# Patient Record
Sex: MALE | Race: WHITE
[De-identification: names, ages, dates, MRNs, and addresses within clinical notes are randomized per-mention and may not be internally consistent; named-entity substitution may affect disease eponyms.]

---

## 2017-09-16 ENCOUNTER — HOSPITAL ENCOUNTER (EMERGENCY)
Dept: HOSPITAL 47 - EC | Age: 42
Discharge: HOME | End: 2017-09-16
Payer: COMMERCIAL

## 2017-09-16 VITALS
RESPIRATION RATE: 18 BRPM | TEMPERATURE: 98.4 F | DIASTOLIC BLOOD PRESSURE: 72 MMHG | HEART RATE: 82 BPM | SYSTOLIC BLOOD PRESSURE: 118 MMHG

## 2017-09-16 DIAGNOSIS — Z79.899: ICD-10-CM
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
## 2017-09-16 DIAGNOSIS — S46.911A: Primary | ICD-10-CM

## 2017-09-16 DIAGNOSIS — Z79.1: ICD-10-CM

## 2017-09-16 PROCEDURE — 99284 EMERGENCY DEPT VISIT MOD MDM: CPT

## 2017-09-16 NOTE — XR
EXAMINATION TYPE: XR shoulder complete RT , 3 VIEWS

 

DATE OF EXAM ORDERED: 9/16/2017

 

HISTORY: Pain.

 

COMPARISON: None.

 

FINDINGS:  No fracture, dislocation or other acute osseous lesion is seen.

 

IMPRESSION: 

 

NORMAL RIGHT SHOULDER.

## 2017-09-16 NOTE — ED
General Adult HPI





- General


Chief complaint: Extremity Injury, Upper


Stated complaint: RT SHOULDER PAIN


Time Seen by Provider: 09/16/17 12:43


Source: patient, RN notes reviewed, old records reviewed


Mode of arrival: ambulatory


Limitations: no limitations





- History of Present Illness


Initial comments: 





Chief complaint history of present illness a 41-year-old male reports that he 

was using a tire iron try to remove tire today when he felt a sharp pop in his 

right shoulder and has pain now over the deltoid region.





- Related Data


 Home Medications











 Medication  Instructions  Recorded  Confirmed


 


Ibuprofen 800 mg PO Q6H 09/16/17 09/16/17


 


Loratadine 10 mg PO DAILY 09/16/17 09/16/17








 Previous Rx's











 Medication  Instructions  Recorded


 


Ibuprofen [Motrin] 600 mg PO Q6HR PRN #20 tab 09/16/17











 Allergies











Allergy/AdvReac Type Severity Reaction Status Date / Time


 


No Known Allergies Allergy   Verified 09/16/17 14:12














Review of Systems


ROS Statement: 


Those systems with pertinent positive or pertinent negative responses have been 

documented in the HPI.


Review of systems mild discomfort to the right side of his neck as well as the 

right shoulder more deltoid region.  The patient reports he feels better in a 

sling.  He is able to put his right hand on his left shoulder.  No evidence of 

dislocation.  Patient denies chest pain shortness breath GI/ problems no 

other deficits.  All systems were reviewed.





Past medical problems significant for seasonal ALLERGIES and X-ray suggests 

sarcoidosis or nodular pattern from granulomatous disease.  Patient surgeries: 

Laminectomy L5-S1 area.  Family history diabetes but no cancers.  The patient 

states she does not smoke and alcohol socially.  No exposure harmful chemicals.


ROS Other: All systems not noted in ROS Statement are negative.





Past Medical History


Additional Past Medical History / Comment(s): seasonal allergies


History of Any Multi-Drug Resistant Organisms: None Reported


Additional Past Surgical History / Comment(s): laminectomy 203 l5s1, hemmorhoid


Past Psychological History: No Psychological Hx Reported


Smoking Status: Never smoker


Past Alcohol Use History: Occasional


Past Drug Use History: Marijuana





General Exam





- General Exam Comments


Initial Comments: 





General:


The patient is awake and alert, in no distress, and does not appear acutely 

ill.  Patient has a sling which is decreasing discomfort to his right shoulder.

  Vital signs temp 98.4 pulse 82 respiratory rate 18 pulse ox on percent room 

air blood pressure 118/72


Eye:


Pupils are equal, round and reactive to light, extra-ocular movements are intact

; there is normal conjunctiva bilaterally. No signs of icterus. 


Ears, nose, mouth and throat:


There are moist mucous membranes and no oral lesions. 


Neck:


The neck is supple, there is no tenderness minimal discomfort to the right side 

of his neck from straining so hard with a tire iron and the right shoulder.


Cardiovascular:


There is a regular rate and rhythm. No murmur, rub or gallop is appreciated.


Respiratory:


Lungs are clear to auscultation, respirations are non-labored, breath sounds 

are equal. No wheezes, stridor, rales, or rhonchi.


Gastrointestinal:


No abdominal pain no nausea no vomiting.


Back:


Chronic low back pain.  History of previous ruptured disc.


Musculoskeletal:


Right shoulder pain especially along the deltoid muscle area.  Minimal to no 

swelling.  Neurovascular status to hands intact pain is limited to the shoulder 

area.  Able to open close his hand flex his wrist pronate the elbow.


Neurological:


No neuro deficits


Skin:


Skin is warm and dry and no rashes or lesions are noted. 


 


Limitations: no limitations





Course


 Vital Signs











  09/16/17





  12:06


 


Temperature 98.4 F


 


Pulse Rate 82


 


Respiratory 18





Rate 


 


Blood Pressure 118/72


 


O2 Sat by Pulse 100





Oximetry 














Medical Decision Making





- Medical Decision Making





X-ray of the right shoulder was done reviewed radiologist.  His findings are no 

fracture, dislocation or other acute osseous lesion is seen.  Impression normal 

right shoulder.  As read by Dr. Douglass.








It is noted that there are some nodules within the x-ray of the right shoulder.

  This compared to an x-ray done 5 years ago.  And they are similar.  





Cussed these findings with the patient he said yes he is known about this for 5 

years.  But only in the last several months has he decided to follow-up with 

another chest x-ray done at the other hospital in town showed nodularity as 

well.  He reports 4 months ago he provided sputum samples and he has had 

endoscopy with biopsy of the lung tissue.  He is receiving the results of the 

next several days.





Disposition


Clinical Impression: 


 Right shoulder strain





Disposition: HOME SELF-CARE


Condition: Stable


Additional Instructions: 


Wear sling for comfort.  Ice to shoulder as needed.  Follow-up family doctor 

and orthopod as needed.  Dr. Meredith on-call today who don't have an orthopod.  

Follow-up with your lung doctor for final results concerning the pulmonary 

nodularities.


Prescriptions: 


Ibuprofen [Motrin] 600 mg PO Q6HR PRN #20 tab


 PRN Reason: Pain


Referrals: 


Jerrica Gordon MD [Primary Care Provider] - 1-2 days


Time of Disposition: 14:25

## 2018-10-23 ENCOUNTER — HOSPITAL ENCOUNTER (OUTPATIENT)
Dept: HOSPITAL 47 - ORWHC2ENDO | Age: 43
Discharge: HOME | End: 2018-10-23
Payer: COMMERCIAL

## 2018-10-23 VITALS — SYSTOLIC BLOOD PRESSURE: 126 MMHG | HEART RATE: 84 BPM | DIASTOLIC BLOOD PRESSURE: 67 MMHG

## 2018-10-23 VITALS — BODY MASS INDEX: 23.3 KG/M2

## 2018-10-23 VITALS — RESPIRATION RATE: 16 BRPM

## 2018-10-23 VITALS — TEMPERATURE: 97.8 F

## 2018-10-23 DIAGNOSIS — Z79.899: ICD-10-CM

## 2018-10-23 DIAGNOSIS — G89.29: ICD-10-CM

## 2018-10-23 DIAGNOSIS — Z79.1: ICD-10-CM

## 2018-10-23 DIAGNOSIS — M54.9: ICD-10-CM

## 2018-10-23 DIAGNOSIS — D86.9: ICD-10-CM

## 2018-10-23 DIAGNOSIS — K52.9: ICD-10-CM

## 2018-10-23 DIAGNOSIS — K29.80: ICD-10-CM

## 2018-10-23 DIAGNOSIS — K20.9: ICD-10-CM

## 2018-10-23 DIAGNOSIS — K29.50: Primary | ICD-10-CM

## 2018-10-23 DIAGNOSIS — K44.9: ICD-10-CM

## 2018-10-23 DIAGNOSIS — K62.89: ICD-10-CM

## 2018-10-23 PROCEDURE — 45380 COLONOSCOPY AND BIOPSY: CPT

## 2018-10-23 PROCEDURE — 88305 TISSUE EXAM BY PATHOLOGIST: CPT

## 2018-10-23 PROCEDURE — 43239 EGD BIOPSY SINGLE/MULTIPLE: CPT

## 2018-10-23 NOTE — P.PCN
Date of Procedure: 10/23/18


Procedure(s) Performed: 


procedure: 1. Esophagogastroduodenoscopy and biopsy.  2. Colonoscopy and biopsy.





Preoperative diagnosis: Atypical chest pain and change in bowel habits.





Postoperative diagnosis: 1. Small sliding hiatal hernia with no obvious 

esophagitis or complicated reflux disease.  2. Mild antral gastritis.  3. 

Normal colon and terminal ileum.  4. Biopsies obtained from the duodenum, antrum

, esophagus, terminal ileum and right colon.





Preparation: HalfLytely prep.





Sedation: Was provided by anesthesia.





Brief clinical history: The patient is a 42-year-old male who is scheduled for 

this evaluation because of atypical chest pains and loose stools of 2 months 

duration.  The patient has history of sarcoidosis.





Procedure: With the patient on his left lateral decubitus position and after 

informed consent and adequate sedation, I passed the Olympus- video 

upper endoscope through the cricopharyngeus down the esophagus.  GE junction 

was around 42 cm from the incisors and there was a small sliding hiatal hernia.

  The esophagus did not show any obvious erosions, ulcers, strictures or Delacruz

's esophagus.  The endoscope was then passed into the stomach which was 

insufflated with air and inspected in detail including the retroflex view in 

the cardia.  There was some mottling and erythema in the antrum but no ulcers 

or erosions.  Pyloric channel, duodenal bulb, post bulbar area and descending 

duodenum where essentially within normal limits.  Because of his symptoms, I 

obtained biopsies from the duodenum, antrum and esophagus then the endoscope 

was withdrawn and I proceeded with the colonoscopy.





Perianal area did not show any fissures or fistulas.  There were no masses felt 

on digital rectal examination.  The Olympus CFQ 160L video colonoscope was then 

inserted in the rectum in the usual fashion and advanced to the cecum.  I 

intubated the ileocecal valve and examined the terminal ileum.  Terminal ileum 

and colon appeared healthy with no edema, erythema, friability, ulceration, 

exudation or spontaneous bleeding.  No diverticular disease or any polyps or 

tumors were seen.  I retroflexed the endoscope in the rectum before the 

endoscope was withdrawn.  I obtained biopsies from the terminal ileum and right 

colon as well.





The patient tolerated the procedure well.





Plan: The patient was reassured.  Will await biopsy results and make further 

plans based on his course and biopsy results.I will keep you updated on his 

progress.

## 2021-10-21 ENCOUNTER — HOSPITAL ENCOUNTER (EMERGENCY)
Dept: HOSPITAL 47 - EC | Age: 46
Discharge: HOME | End: 2021-10-21
Payer: COMMERCIAL

## 2021-10-21 VITALS — TEMPERATURE: 98 F | RESPIRATION RATE: 18 BRPM

## 2021-10-21 VITALS — HEART RATE: 79 BPM | SYSTOLIC BLOOD PRESSURE: 139 MMHG | DIASTOLIC BLOOD PRESSURE: 86 MMHG

## 2021-10-21 DIAGNOSIS — N20.0: Primary | ICD-10-CM

## 2021-10-21 DIAGNOSIS — F12.90: ICD-10-CM

## 2021-10-21 DIAGNOSIS — F41.9: ICD-10-CM

## 2021-10-21 LAB
ALBUMIN SERPL-MCNC: 4.7 G/DL (ref 3.5–5)
ALP SERPL-CCNC: 78 U/L (ref 38–126)
ALT SERPL-CCNC: 25 U/L (ref 4–49)
AMYLASE SERPL-CCNC: 52 U/L (ref 30–110)
ANION GAP SERPL CALC-SCNC: 13 MMOL/L
APTT BLD: 24.3 SEC (ref 22–30)
AST SERPL-CCNC: 32 U/L (ref 17–59)
BASOPHILS # BLD AUTO: 0 K/UL (ref 0–0.2)
BASOPHILS NFR BLD AUTO: 1 %
BUN SERPL-SCNC: 19 MG/DL (ref 9–20)
CALCIUM SPEC-MCNC: 10.3 MG/DL (ref 8.4–10.2)
CHLORIDE SERPL-SCNC: 107 MMOL/L (ref 98–107)
CO2 SERPL-SCNC: 18 MMOL/L (ref 22–30)
EOSINOPHIL # BLD AUTO: 0.1 K/UL (ref 0–0.7)
EOSINOPHIL NFR BLD AUTO: 2 %
ERYTHROCYTE [DISTWIDTH] IN BLOOD BY AUTOMATED COUNT: 5.2 M/UL (ref 4.3–5.9)
ERYTHROCYTE [DISTWIDTH] IN BLOOD: 12.8 % (ref 11.5–15.5)
GLUCOSE BLD-MCNC: 125 MG/DL (ref 75–99)
GLUCOSE SERPL-MCNC: 122 MG/DL (ref 74–99)
HCT VFR BLD AUTO: 44.4 % (ref 39–53)
HGB BLD-MCNC: 16.3 GM/DL (ref 13–17.5)
INR PPP: 1.1 (ref ?–1.2)
KETONES UR QL STRIP.AUTO: (no result)
LIPASE SERPL-CCNC: 57 U/L (ref 23–300)
LYMPHOCYTES # SPEC AUTO: 1.5 K/UL (ref 1–4.8)
LYMPHOCYTES NFR SPEC AUTO: 25 %
MCH RBC QN AUTO: 31.3 PG (ref 25–35)
MCHC RBC AUTO-ENTMCNC: 36.7 G/DL (ref 31–37)
MCV RBC AUTO: 85.5 FL (ref 80–100)
MONOCYTES # BLD AUTO: 0.4 K/UL (ref 0–1)
MONOCYTES NFR BLD AUTO: 7 %
NEUTROPHILS # BLD AUTO: 3.9 K/UL (ref 1.3–7.7)
NEUTROPHILS NFR BLD AUTO: 63 %
PH UR: 7.5 [PH] (ref 5–8)
PLATELET # BLD AUTO: 403 K/UL (ref 150–450)
POTASSIUM SERPL-SCNC: 4.2 MMOL/L (ref 3.5–5.1)
PROT SERPL-MCNC: 7.8 G/DL (ref 6.3–8.2)
PT BLD: 11.2 SEC (ref 9–12)
RBC UR QL: 18 /HPF (ref 0–5)
SODIUM SERPL-SCNC: 138 MMOL/L (ref 137–145)
SP GR UR: 1.01 (ref 1–1.03)
UROBILINOGEN UR QL STRIP: <2 MG/DL (ref ?–2)
WBC # BLD AUTO: 6.2 K/UL (ref 3.8–10.6)
WBC # UR AUTO: 1 /HPF (ref 0–5)

## 2021-10-21 PROCEDURE — 81001 URINALYSIS AUTO W/SCOPE: CPT

## 2021-10-21 PROCEDURE — 83605 ASSAY OF LACTIC ACID: CPT

## 2021-10-21 PROCEDURE — 85610 PROTHROMBIN TIME: CPT

## 2021-10-21 PROCEDURE — 96374 THER/PROPH/DIAG INJ IV PUSH: CPT

## 2021-10-21 PROCEDURE — 83690 ASSAY OF LIPASE: CPT

## 2021-10-21 PROCEDURE — 71046 X-RAY EXAM CHEST 2 VIEWS: CPT

## 2021-10-21 PROCEDURE — 96375 TX/PRO/DX INJ NEW DRUG ADDON: CPT

## 2021-10-21 PROCEDURE — 96361 HYDRATE IV INFUSION ADD-ON: CPT

## 2021-10-21 PROCEDURE — 36415 COLL VENOUS BLD VENIPUNCTURE: CPT

## 2021-10-21 PROCEDURE — 99284 EMERGENCY DEPT VISIT MOD MDM: CPT

## 2021-10-21 PROCEDURE — 80053 COMPREHEN METABOLIC PANEL: CPT

## 2021-10-21 PROCEDURE — 93005 ELECTROCARDIOGRAM TRACING: CPT

## 2021-10-21 PROCEDURE — 85025 COMPLETE CBC W/AUTO DIFF WBC: CPT

## 2021-10-21 PROCEDURE — 74018 RADEX ABDOMEN 1 VIEW: CPT

## 2021-10-21 PROCEDURE — 84484 ASSAY OF TROPONIN QUANT: CPT

## 2021-10-21 PROCEDURE — 82150 ASSAY OF AMYLASE: CPT

## 2021-10-21 PROCEDURE — 85730 THROMBOPLASTIN TIME PARTIAL: CPT

## 2021-10-21 PROCEDURE — 74176 CT ABD & PELVIS W/O CONTRAST: CPT

## 2021-10-21 NOTE — XR
[facility]

 

HISTORY: Abdominal pain

 

Frontal [facility] and 2 images correlated to CT scan 10/21/2021

 

The distal left ureteral calculus is noted on plain film measuring approximately 3 mm. No evident bow
el obstruction or pneumoperitoneum. Bone mineralization is normal. Lung bases are remarkable for bila
teral pulmonary nodules.

 

IMPRESSION: Distal left ureteral calculus. Findings consistent with patient's history of sarcoid.

## 2021-10-21 NOTE — ED
General Adult HPI





- General


Chief complaint: Abdominal Pain


Stated complaint: left side abd pain/SOB


Time Seen by Provider: 10/21/21 17:52


Source: patient, family, RN notes reviewed, old records reviewed


Mode of arrival: wheelchair


Limitations: no limitations





- History of Present Illness


Initial comments: 





45-year-old male presenting with sudden onset left flank pain and left-sided 

abdominal pain.  Patient had been in triage, he became vasovagal and nearly 

passed out.  This was secondary to significant pain.  He had nausea and 

vomiting.  No previous history of renal colic.





- Related Data


                                Home Medications











 Medication  Instructions  Recorded  Confirmed


 


Naproxen [Naprosyn] 500 mg PO Q12HR PRN 10/21/21 10/21/21








                                  Previous Rx's











 Medication  Instructions  Recorded


 


HYDROcodone/APAP 5-325MG [Norco 1 tab PO Q6HR PRN #12 tab 10/21/21





5-325]  


 


Ibuprofen [Motrin] 600 mg PO Q8HR PRN #24 tab 10/21/21


 


Ondansetron Odt [Zofran Odt] 4 mg PO Q8HR PRN #10 tab 10/21/21


 


Tamsulosin [Flomax] 0.4 mg PO DAILY #7 cap 10/21/21











                                    Allergies











Allergy/AdvReac Type Severity Reaction Status Date / Time


 


No Known Allergies Allergy   Verified 10/21/21 18:19














Review of Systems


ROS Statement: 


Those systems with pertinent positive or pertinent negative responses have been 

documented in the HPI.





ROS Other: All systems not noted in ROS Statement are negative.





Past Medical History


Past Medical History: Musculoskeletal Disorder


Additional Past Medical History / Comment(s): SARCOIDOSIS LUNGS, SEASONAL 

ALLERGIES, BACK PAIN WITH SCIATICA (RE-RUPTURED DISC L-5 & S-1), STATES FREQUENT

 DIARRHEA AND NAUSEA FOR 2 MONTHS.


History of Any Multi-Drug Resistant Organisms: None Reported


Additional Past Surgical History / Comment(s): laminectomy, (l-5 & S-1) 

.,hemmorhoid


Past Anesthesia/Blood Transfusion Reactions: No Reported Reaction


Past Psychological History: Anxiety


Smoking Status: Never smoker


Past Alcohol Use History: Occasional


Past Drug Use History: Marijuana





- Past Family History


  ** Mother


Family Medical History: No Reported History





General Exam


Limitations: no limitations


General appearance: alert, in no apparent distress


Head exam: Present: atraumatic, normocephalic


Eye exam: Present: normal appearance, PERRL


ENT exam: Present: normal exam


Neck exam: Present: normal inspection.  Absent: tenderness, meningismus


Respiratory exam: Present: normal lung sounds bilaterally.  Absent: respiratory 

distress, wheezes


Cardiovascular Exam: Present: regular rate, normal rhythm


GI/Abdominal exam: Present: soft, tenderness (left upper quadrant).  Absent: 

distended


Extremities exam: Present: normal inspection, normal capillary refill.  Absent: 

pedal edema


Back exam: Present: CVA tenderness (L)


Neurological exam: Present: alert, oriented X3, CN II-XII intact.  Absent: motor

 sensory deficit


Psychiatric exam: Present: normal affect, normal mood


Skin exam: Present: warm, dry, intact.  Absent: cyanosis, diaphoretic





Course





                                   Vital Signs











  10/21/21 10/21/21 10/21/21





  17:59 18:18 19:52


 


Temperature 98 F  


 


Pulse Rate 77  82


 


Respiratory 18  18





Rate   


 


Blood Pressure 126/101 135/96 130/83


 


O2 Sat by Pulse 100  95





Oximetry   














EKG Findings





- EKG Comments:


EKG Findings:: EKG: Normal sinus rhythm with sinus arrhythmia rate of 84, VT 

interval 142 QRS duration 86 , no ST segment elevation.





Medical Decision Making





- Medical Decision Making





45-year-old male who presented with sudden onset left flank pain suggestive of 

renal colic.  He had a near syncopal episode which was laced leave vasovagal 

secondary to pain.  He was not hypotensive or bradycardic.





- Lab Data


Result diagrams: 


                                 10/21/21 18:01





                                 10/21/21 18:01





                                   Lab Results











  10/21/21 10/21/21 10/21/21 Range/Units





  18:01 18:01 18:01 


 


WBC  6.2    (3.8-10.6)  k/uL


 


RBC  5.20    (4.30-5.90)  m/uL


 


Hgb  16.3    (13.0-17.5)  gm/dL


 


Hct  44.4    (39.0-53.0)  %


 


MCV  85.5    (80.0-100.0)  fL


 


MCH  31.3    (25.0-35.0)  pg


 


MCHC  36.7    (31.0-37.0)  g/dL


 


RDW  12.8    (11.5-15.5)  %


 


Plt Count  403    (150-450)  k/uL


 


MPV  7.2    


 


Neutrophils %  63    %


 


Lymphocytes %  25    %


 


Monocytes %  7    %


 


Eosinophils %  2    %


 


Basophils %  1    %


 


Neutrophils #  3.9    (1.3-7.7)  k/uL


 


Lymphocytes #  1.5    (1.0-4.8)  k/uL


 


Monocytes #  0.4    (0-1.0)  k/uL


 


Eosinophils #  0.1    (0-0.7)  k/uL


 


Basophils #  0.0    (0-0.2)  k/uL


 


Hyperchromasia  Moderate    


 


PT    11.2  (9.0-12.0)  sec


 


INR    1.1  (<1.2)  


 


APTT    24.3  (22.0-30.0)  sec


 


Sodium   138   (137-145)  mmol/L


 


Potassium   4.2   (3.5-5.1)  mmol/L


 


Chloride   107   ()  mmol/L


 


Carbon Dioxide   18 L   (22-30)  mmol/L


 


Anion Gap   13   mmol/L


 


BUN   19   (9-20)  mg/dL


 


Creatinine   1.01   (0.66-1.25)  mg/dL


 


Est GFR (CKD-EPI)AfAm   >90   (>60 ml/min/1.73 sqM)  


 


Est GFR (CKD-EPI)NonAf   >90   (>60 ml/min/1.73 sqM)  


 


Glucose   122 H   (74-99)  mg/dL


 


POC Glucose (mg/dL)     (75-99)  mg/dL


 


POC Glu Operater ID     


 


Lactic Ac Sepsis Rflx     


 


Plasma Lactic Acid Pato     (0.7-2.0)  mmol/L


 


Calcium   10.3 H   (8.4-10.2)  mg/dL


 


Total Bilirubin   1.0   (0.2-1.3)  mg/dL


 


AST   32   (17-59)  U/L


 


ALT   25   (4-49)  U/L


 


Alkaline Phosphatase   78   ()  U/L


 


Troponin I     (0.000-0.034)  ng/mL


 


Total Protein   7.8   (6.3-8.2)  g/dL


 


Albumin   4.7   (3.5-5.0)  g/dL


 


Amylase   52   ()  U/L


 


Lipase   57   ()  U/L














  10/21/21 10/21/21 10/21/21 Range/Units





  18:01 18:01 18:05 


 


WBC     (3.8-10.6)  k/uL


 


RBC     (4.30-5.90)  m/uL


 


Hgb     (13.0-17.5)  gm/dL


 


Hct     (39.0-53.0)  %


 


MCV     (80.0-100.0)  fL


 


MCH     (25.0-35.0)  pg


 


MCHC     (31.0-37.0)  g/dL


 


RDW     (11.5-15.5)  %


 


Plt Count     (150-450)  k/uL


 


MPV     


 


Neutrophils %     %


 


Lymphocytes %     %


 


Monocytes %     %


 


Eosinophils %     %


 


Basophils %     %


 


Neutrophils #     (1.3-7.7)  k/uL


 


Lymphocytes #     (1.0-4.8)  k/uL


 


Monocytes #     (0-1.0)  k/uL


 


Eosinophils #     (0-0.7)  k/uL


 


Basophils #     (0-0.2)  k/uL


 


Hyperchromasia     


 


PT     (9.0-12.0)  sec


 


INR     (<1.2)  


 


APTT     (22.0-30.0)  sec


 


Sodium     (137-145)  mmol/L


 


Potassium     (3.5-5.1)  mmol/L


 


Chloride     ()  mmol/L


 


Carbon Dioxide     (22-30)  mmol/L


 


Anion Gap     mmol/L


 


BUN     (9-20)  mg/dL


 


Creatinine     (0.66-1.25)  mg/dL


 


Est GFR (CKD-EPI)AfAm     (>60 ml/min/1.73 sqM)  


 


Est GFR (CKD-EPI)NonAf     (>60 ml/min/1.73 sqM)  


 


Glucose     (74-99)  mg/dL


 


POC Glucose (mg/dL)    125 H  (75-99)  mg/dL


 


POC Glu Operater ID    Edel Jerry  


 


Lactic Ac Sepsis Rflx     


 


Plasma Lactic Acid Pato  3.3 H*    (0.7-2.0)  mmol/L


 


Calcium     (8.4-10.2)  mg/dL


 


Total Bilirubin     (0.2-1.3)  mg/dL


 


AST     (17-59)  U/L


 


ALT     (4-49)  U/L


 


Alkaline Phosphatase     ()  U/L


 


Troponin I   <0.012   (0.000-0.034)  ng/mL


 


Total Protein     (6.3-8.2)  g/dL


 


Albumin     (3.5-5.0)  g/dL


 


Amylase     ()  U/L


 


Lipase     ()  U/L














  10/21/21 Range/Units





  18:44 


 


WBC   (3.8-10.6)  k/uL


 


RBC   (4.30-5.90)  m/uL


 


Hgb   (13.0-17.5)  gm/dL


 


Hct   (39.0-53.0)  %


 


MCV   (80.0-100.0)  fL


 


MCH   (25.0-35.0)  pg


 


MCHC   (31.0-37.0)  g/dL


 


RDW   (11.5-15.5)  %


 


Plt Count   (150-450)  k/uL


 


MPV   


 


Neutrophils %   %


 


Lymphocytes %   %


 


Monocytes %   %


 


Eosinophils %   %


 


Basophils %   %


 


Neutrophils #   (1.3-7.7)  k/uL


 


Lymphocytes #   (1.0-4.8)  k/uL


 


Monocytes #   (0-1.0)  k/uL


 


Eosinophils #   (0-0.7)  k/uL


 


Basophils #   (0-0.2)  k/uL


 


Hyperchromasia   


 


PT   (9.0-12.0)  sec


 


INR   (<1.2)  


 


APTT   (22.0-30.0)  sec


 


Sodium   (137-145)  mmol/L


 


Potassium   (3.5-5.1)  mmol/L


 


Chloride   ()  mmol/L


 


Carbon Dioxide   (22-30)  mmol/L


 


Anion Gap   mmol/L


 


BUN   (9-20)  mg/dL


 


Creatinine   (0.66-1.25)  mg/dL


 


Est GFR (CKD-EPI)AfAm   (>60 ml/min/1.73 sqM)  


 


Est GFR (CKD-EPI)NonAf   (>60 ml/min/1.73 sqM)  


 


Glucose   (74-99)  mg/dL


 


POC Glucose (mg/dL)   (75-99)  mg/dL


 


POC Glu Operater ID   


 


Lactic Ac Sepsis Rflx  Y  


 


Plasma Lactic Acid Pato   (0.7-2.0)  mmol/L


 


Calcium   (8.4-10.2)  mg/dL


 


Total Bilirubin   (0.2-1.3)  mg/dL


 


AST   (17-59)  U/L


 


ALT   (4-49)  U/L


 


Alkaline Phosphatase   ()  U/L


 


Troponin I   (0.000-0.034)  ng/mL


 


Total Protein   (6.3-8.2)  g/dL


 


Albumin   (3.5-5.0)  g/dL


 


Amylase   ()  U/L


 


Lipase   ()  U/L














Disposition


Clinical Impression: 


 Calculus of kidney, Abdominal pain





Disposition: HOME SELF-CARE


Instructions (If sedation given, give patient instructions):  Abdominal Pain 

(ED), Renal Colic (ED)


Prescriptions: 


Tamsulosin [Flomax] 0.4 mg PO DAILY #7 cap


Ibuprofen [Motrin] 600 mg PO Q8HR PRN #24 tab


 PRN Reason: Pain


HYDROcodone/APAP 5-325MG [Norco 5-325] 1 tab PO Q6HR PRN #12 tab


 PRN Reason: Pain


Ondansetron Odt [Zofran Odt] 4 mg PO Q8HR PRN #10 tab


 PRN Reason: Vomiting


Is patient prescribed a controlled substance at d/c from ED?: No


Referrals: 


Yovani Perkins MD [Primary Care Provider] - 1-2 days


Sheldon Carlos MD [STAFF PHYSICIAN] - 1-2 days


Time of Disposition: 21:13

## 2021-10-21 NOTE — CT
EXAMINATION TYPE: CT abdomen pelvis wo con

 

DATE OF EXAM: 10/21/2021

 

COMPARISON: [facility] 10/21/2021, chest x-ray 5/21/2012

 

HISTORY: Left sided abdominal pain

 

CT DLP: 516.1 mGycm

Automated exposure control for dose reduction was used.

 

TECHNIQUE:  Helical acquisition of images from the lung bases through the pelvis.

 

FINDINGS: Lack of intravenous contrast could compromise sensitivity.

 

LUNG BASES: Multiple varying sizes pulmonary nodules are present at the lung bases, there is abnormal
 pleural thickening present bilaterally, no pleural or pericardial effusion.

 

AORTA:  No significant abnormality is appreciated.

 

LIVER/GB: No significant abnormality is appreciated.

 

PANCREAS: No significant abnormality is seen.

 

SPLEEN: No significant abnormality is seen.

 

ADRENALS: No significant abnormality is seen.

 

KIDNEYS: Punctate nonobstructive calculi are present within the bilateral kidneys. There is perinephr
ic stranding in the left, mild hydronephrosis noted on the left, left-sided hydroureter. At the level
 the distal left ureter there is a punctate calcification present measuring approximately 3 to 4 mm..


 

 

REPRODUCTIVE ORGANS:  Prostate is enlarged and shows associated calcification

 

URINARY BLADDER:  Urinary bladder shows a thickened wall possibly due to chronic outlet obstruction

 

BOWEL:  No significant abnormality is seen.

 

FREE AIR:  No Free Air is visible.

 

ASCITES:  None visible.

 

PELVIC ADENOPATHY:  None visualized.

 

RETROPERITONEAL ADENOPATHY:  No Retroperitoneal Adenopathy visible.

 

OSSEOUS STRUCTURES:  Degenerative disc changes, facet arthropathy noted especially in the lower lumba
r spine.

 

IMPRESSION: 

MULTIPLE PULMONARY NODULES, CONSIDER GRANULOMATOUS DISEASE, PATIENT WITH HISTORY OF SARCOID. BILATERA
L NEPHROLITHIASIS , OBSTRUCTIVE LEFT URETEROLITHIASIS NONCONTRAST EXAM.

## 2021-10-21 NOTE — XR
EXAMINATION TYPE: XR chest 2V

 

DATE OF EXAM: 10/21/2021

 

COMPARISON: Chest x-ray 5/21/2012

 

HISTORY: Abdominal pain, shortness of breath

 

TECHNIQUE:  Frontal and lateral views of the chest are obtained.

 

FINDINGS:  Bilateral pulmonary nodules are present. There is been progression in apical pleural thick
ening. Cardiac mediastinal silhouette shows some retraction of the parker superiorly. There are overlyi
ng leads. No evident pneumothorax or pleural effusion.

 

IMPRESSION:  Findings likely relate to patient's history of sarcoid.

## 2023-08-09 ENCOUNTER — HOSPITAL ENCOUNTER (OUTPATIENT)
Dept: HOSPITAL 47 - LABWHC1 | Age: 48
Discharge: HOME | End: 2023-08-09
Attending: ALLERGY & IMMUNOLOGY
Payer: COMMERCIAL

## 2023-08-09 DIAGNOSIS — J31.0: Primary | ICD-10-CM

## 2023-08-09 PROCEDURE — 86003 ALLG SPEC IGE CRUDE XTRC EA: CPT

## 2023-08-09 PROCEDURE — 36415 COLL VENOUS BLD VENIPUNCTURE: CPT

## 2023-08-09 PROCEDURE — 82785 ASSAY OF IGE: CPT

## 2023-08-10 LAB
A ALTERNATA IGE QN: <0.1 KU/L
A FUMIGATUS IGE QN: <0.1 KU/L
AMER BEECH IGE QN: <0.1 KU/L (ref ?–0.1)
AMER SYCAMORE IGE QN: <0.1 KU/L (ref ?–0.1)
C HERBARUM IGE QN: <0.1 KU/L
CAT DANDER IGE QN: <0.1 KU/L
COMMON RAGWEED IGE QN: <0.1 KU/L
D FARINAE IGE QN: <0.1 KU/L
DEPRECATED AMER BEECH IGE RAST QL: (no result)
DEPRECATED GOLDENROD IGE RAST QL: (no result)
DEPRECATED KENT BLUE GRASS IGE RAST QL: (no result)
DEPRECATED MEADOW FESCUE IGE RAST QL: (no result)
DEPRECATED SHEEP SORREL IGE RAST QL: (no result)
DEPRECATED WILLOW IGE RAST QL: (no result)
ENGL PLANTAIN IGE RAST: (no result)
GOLDENROD IGE QN: <0.1 KU/L (ref ?–0.1)
GOOSEFOOT IGE QN: <0.1 KU/L (ref ?–0.1)
GOOSEFOOT IGE RAST: (no result)
KENT BLUE GRASS IGG QN: <0.1 KU/L (ref ?–0.1)
MEADOW FESCUE IGE QN: <0.1 KU/L (ref ?–0.1)
MISC ALLERGEN IGE RAST: (no result)
MISC ALLERGEN IGE RAST: (no result)
P NOTATUM IGE QN: (no result)
PECAN/HICK NUT IGE QN: (no result)
PECAN/HICK NUT IGE QN: <0.1 KU/L (ref ?–0.1)
RED OAK IGE QN: <0.1 KU/L
RED TOP GRASS IGE QN: <0.1 KU/L
WILLOW IGE QN: <0.1 KU/L (ref ?–0.1)